# Patient Record
Sex: MALE | Race: WHITE | Employment: FULL TIME | URBAN - METROPOLITAN AREA
[De-identification: names, ages, dates, MRNs, and addresses within clinical notes are randomized per-mention and may not be internally consistent; named-entity substitution may affect disease eponyms.]

---

## 2018-09-20 ENCOUNTER — TELEPHONE (OUTPATIENT)
Dept: FAMILY MEDICINE CLINIC | Age: 53
End: 2018-09-20

## 2018-12-30 ENCOUNTER — TELEPHONE (OUTPATIENT)
Dept: FAMILY MEDICINE CLINIC | Age: 53
End: 2018-12-30

## 2018-12-30 DIAGNOSIS — R05.9 COUGH: Primary | ICD-10-CM

## 2018-12-30 RX ORDER — PROMETHAZINE HYDROCHLORIDE AND CODEINE PHOSPHATE 6.25; 1 MG/5ML; MG/5ML
5 SYRUP ORAL EVERY 4 HOURS PRN
Qty: 118 ML | Refills: 0 | Status: SHIPPED | OUTPATIENT
Start: 2018-12-30 | End: 2019-01-02

## 2018-12-30 RX ORDER — AZITHROMYCIN 250 MG/1
250 TABLET, FILM COATED ORAL SEE ADMIN INSTRUCTIONS
Qty: 6 TABLET | Refills: 0 | Status: SHIPPED | OUTPATIENT
Start: 2018-12-30 | End: 2019-01-04

## 2018-12-30 NOTE — TELEPHONE ENCOUNTER
Ill and texted my personal cell    Controlled Substances Monitoring:     RX Monitoring 12/30/2018   Attestation The Prescription Monitoring Report for this patient was reviewed today. Patient notified via text, needs seen if not better.

## 2019-07-19 ENCOUNTER — OFFICE VISIT (OUTPATIENT)
Dept: FAMILY MEDICINE CLINIC | Age: 54
End: 2019-07-19
Payer: COMMERCIAL

## 2019-07-19 VITALS
OXYGEN SATURATION: 99 % | DIASTOLIC BLOOD PRESSURE: 68 MMHG | HEART RATE: 78 BPM | WEIGHT: 198.6 LBS | BODY MASS INDEX: 24.69 KG/M2 | SYSTOLIC BLOOD PRESSURE: 120 MMHG | HEIGHT: 75 IN | RESPIRATION RATE: 14 BRPM

## 2019-07-19 DIAGNOSIS — R35.1 NOCTURIA: Primary | ICD-10-CM

## 2019-07-19 DIAGNOSIS — Z00.00 WELL ADULT EXAM: ICD-10-CM

## 2019-07-19 DIAGNOSIS — Z80.42 FAMILY HISTORY OF PROSTATE CANCER IN FATHER: ICD-10-CM

## 2019-07-19 LAB
A/G RATIO: 1.8 (ref 1.1–2.2)
ALBUMIN SERPL-MCNC: 4.7 G/DL (ref 3.4–5)
ALP BLD-CCNC: 88 U/L (ref 40–129)
ALT SERPL-CCNC: 23 U/L (ref 10–40)
ANION GAP SERPL CALCULATED.3IONS-SCNC: 10 MMOL/L (ref 3–16)
AST SERPL-CCNC: 24 U/L (ref 15–37)
BILIRUB SERPL-MCNC: <0.2 MG/DL (ref 0–1)
BUN BLDV-MCNC: 14 MG/DL (ref 7–20)
CALCIUM SERPL-MCNC: 9.9 MG/DL (ref 8.3–10.6)
CHLORIDE BLD-SCNC: 103 MMOL/L (ref 99–110)
CHOLESTEROL, TOTAL: 190 MG/DL (ref 0–199)
CO2: 27 MMOL/L (ref 21–32)
CREAT SERPL-MCNC: 1 MG/DL (ref 0.9–1.3)
GFR AFRICAN AMERICAN: >60
GFR NON-AFRICAN AMERICAN: >60
GLOBULIN: 2.6 G/DL
GLUCOSE BLD-MCNC: 71 MG/DL (ref 70–99)
HCT VFR BLD CALC: 47.2 % (ref 40.5–52.5)
HDLC SERPL-MCNC: 52 MG/DL (ref 40–60)
HEMOGLOBIN: 16.1 G/DL (ref 13.5–17.5)
LDL CHOLESTEROL CALCULATED: 119 MG/DL
MCH RBC QN AUTO: 32.1 PG (ref 26–34)
MCHC RBC AUTO-ENTMCNC: 34.2 G/DL (ref 31–36)
MCV RBC AUTO: 93.9 FL (ref 80–100)
PDW BLD-RTO: 13 % (ref 12.4–15.4)
PLATELET # BLD: 226 K/UL (ref 135–450)
PMV BLD AUTO: 9.1 FL (ref 5–10.5)
POTASSIUM SERPL-SCNC: 4.4 MMOL/L (ref 3.5–5.1)
PROSTATE SPECIFIC ANTIGEN: 1.02 NG/ML (ref 0–4)
RBC # BLD: 5.03 M/UL (ref 4.2–5.9)
SODIUM BLD-SCNC: 140 MMOL/L (ref 136–145)
TOTAL PROTEIN: 7.3 G/DL (ref 6.4–8.2)
TRIGL SERPL-MCNC: 95 MG/DL (ref 0–150)
VLDLC SERPL CALC-MCNC: 19 MG/DL
WBC # BLD: 5.1 K/UL (ref 4–11)

## 2019-07-19 PROCEDURE — 99213 OFFICE O/P EST LOW 20 MIN: CPT | Performed by: FAMILY MEDICINE

## 2019-07-19 ASSESSMENT — ENCOUNTER SYMPTOMS
CHEST TIGHTNESS: 0
COUGH: 0
WHEEZING: 0
ABDOMINAL PAIN: 0
BACK PAIN: 0
SHORTNESS OF BREATH: 0

## 2019-07-19 ASSESSMENT — PATIENT HEALTH QUESTIONNAIRE - PHQ9
SUM OF ALL RESPONSES TO PHQ9 QUESTIONS 1 & 2: 0
SUM OF ALL RESPONSES TO PHQ QUESTIONS 1-9: 0
2. FEELING DOWN, DEPRESSED OR HOPELESS: 0
SUM OF ALL RESPONSES TO PHQ QUESTIONS 1-9: 0
1. LITTLE INTEREST OR PLEASURE IN DOING THINGS: 0

## 2019-07-19 NOTE — PROGRESS NOTES
Chief Complaint   Patient presents with   Adele Duong     father DX with Prostate Cancer       [place] NARRATIVE:    Generally healthy gentleman with no current sx or issues. However, his 79 yo father is recently dx with aggressive prostate cancer and he wishes to be tested for his status. We discussed that the biggest risk of PSA testing is false positives or insignificant positives. Patient is established unless otherwise noted. Above chief complaint and [place] obtained by this physician provider. Review of Systems   Constitutional: Negative for activity change, chills, fatigue and fever. HENT: Negative for congestion. Respiratory: Negative for cough, chest tightness, shortness of breath and wheezing. Cardiovascular: Negative for chest pain and leg swelling. Gastrointestinal: Negative for abdominal pain. Genitourinary: Positive for frequency (one extra trip to the bathroom each night, which is old but a little bit worse in the summer). Musculoskeletal: Negative for back pain and myalgias. Skin: Negative for rash. Psychiatric/Behavioral: Negative for behavioral problems and dysphoric mood. Allergies   Allergen Reactions    Cat Hair Extract Anaphylaxis     Red eyes    Dust Mite Extract      Allergy historyupdated. No outpatient medications have been marked as taking for the 7/19/19 encounter (Office Visit) with Carey Borja MD.       Tobacco use history updated. Social History     Tobacco Use   Smoking Status Never Smoker   Smokeless Tobacco Never Used        Nursing note reviewed.     Vitals:    07/19/19 0918   BP: 120/68   Site: Right Upper Arm   Position: Sitting   Cuff Size: Medium Adult   Pulse: 78   Resp: 14   SpO2: 99%   Weight: 198 lb 9.6 oz (90.1 kg)   Height: 6' 3\" (1.905 m)          BP Readings from Last 3 Encounters:   07/19/19 120/68   12/28/16 112/66   10/28/16 124/74     Wt Readings from Last 3 Encounters:   07/19/19 198 lb 9.6 oz (90.1 kg)   12/28/16 193 lb

## 2019-11-12 ENCOUNTER — OFFICE VISIT (OUTPATIENT)
Dept: FAMILY MEDICINE CLINIC | Age: 54
End: 2019-11-12
Payer: COMMERCIAL

## 2019-11-12 VITALS
OXYGEN SATURATION: 96 % | WEIGHT: 196.4 LBS | BODY MASS INDEX: 24.55 KG/M2 | SYSTOLIC BLOOD PRESSURE: 102 MMHG | TEMPERATURE: 98.3 F | HEART RATE: 97 BPM | DIASTOLIC BLOOD PRESSURE: 70 MMHG

## 2019-11-12 DIAGNOSIS — H92.02 OTALGIA OF LEFT EAR: Primary | ICD-10-CM

## 2019-11-12 DIAGNOSIS — H91.92 HEARING LOSS OF LEFT EAR, UNSPECIFIED HEARING LOSS TYPE: ICD-10-CM

## 2019-11-12 PROCEDURE — 99213 OFFICE O/P EST LOW 20 MIN: CPT | Performed by: FAMILY MEDICINE

## 2019-11-12 RX ORDER — AZITHROMYCIN 250 MG/1
250 TABLET, FILM COATED ORAL SEE ADMIN INSTRUCTIONS
Qty: 6 TABLET | Refills: 0 | Status: SHIPPED | OUTPATIENT
Start: 2019-11-12 | End: 2019-11-17

## 2019-11-23 ASSESSMENT — ENCOUNTER SYMPTOMS
BACK PAIN: 0
WHEEZING: 0
COUGH: 0
ABDOMINAL PAIN: 0
SHORTNESS OF BREATH: 0
CHEST TIGHTNESS: 0

## 2021-06-15 ENCOUNTER — OFFICE VISIT (OUTPATIENT)
Dept: FAMILY MEDICINE CLINIC | Age: 56
End: 2021-06-15
Payer: COMMERCIAL

## 2021-06-15 VITALS
WEIGHT: 197 LBS | OXYGEN SATURATION: 94 % | BODY MASS INDEX: 24.62 KG/M2 | HEART RATE: 102 BPM | DIASTOLIC BLOOD PRESSURE: 78 MMHG | SYSTOLIC BLOOD PRESSURE: 110 MMHG

## 2021-06-15 DIAGNOSIS — F43.22 ADJUSTMENT DISORDER WITH ANXIOUS MOOD: ICD-10-CM

## 2021-06-15 DIAGNOSIS — Z00.00 WELL ADULT EXAM: Primary | ICD-10-CM

## 2021-06-15 PROCEDURE — 99396 PREV VISIT EST AGE 40-64: CPT | Performed by: FAMILY MEDICINE

## 2021-06-15 RX ORDER — LORAZEPAM 0.5 MG/1
0.5 TABLET ORAL DAILY PRN
Qty: 15 TABLET | Refills: 0 | Status: SHIPPED | OUTPATIENT
Start: 2021-06-15 | End: 2021-07-15

## 2021-06-15 RX ORDER — ESCITALOPRAM OXALATE 10 MG/1
10 TABLET ORAL DAILY
Qty: 30 TABLET | Refills: 5 | Status: SHIPPED | OUTPATIENT
Start: 2021-06-15 | End: 2021-07-22

## 2021-06-15 ASSESSMENT — PATIENT HEALTH QUESTIONNAIRE - PHQ9
2. FEELING DOWN, DEPRESSED OR HOPELESS: 0
SUM OF ALL RESPONSES TO PHQ9 QUESTIONS 1 & 2: 0
SUM OF ALL RESPONSES TO PHQ QUESTIONS 1-9: 0
SUM OF ALL RESPONSES TO PHQ QUESTIONS 1-9: 0
1. LITTLE INTEREST OR PLEASURE IN DOING THINGS: 0
SUM OF ALL RESPONSES TO PHQ QUESTIONS 1-9: 0

## 2021-06-15 NOTE — PROGRESS NOTES
Chief Complaint   Patient presents with    Annual Exam     here requesting annual exam but not BW, 56 with no known medical problems, active with good BP    Stress Reaction     having a significant stress reaction due to some financial loans that he needs to work with and also 1tenn-21year olds in the house wearing his patience OUT       [place] NARRATIVE:    As above. He does not sleep well and is short tempered. He feels like he is yelling a lot at kids and so do they and his wife so we are hoping to consider bridge to help him for a couple months. He is not much interested in counselling but is willing to try medication. He is off work for the summer. 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 Patient is established unless otherwise noted. Above chief complaint and [place] obtained by this physician provider. Review of Systems   Constitutional: Negative for activity change, chills, fatigue and fever. HENT: Negative for congestion. Respiratory: Negative for cough, chest tightness, shortness of breath and wheezing. Cardiovascular: Negative for chest pain and leg swelling. Gastrointestinal: Negative for abdominal pain. Musculoskeletal: Negative for back pain and myalgias. Skin: Negative for rash. Psychiatric/Behavioral: Positive for decreased concentration, dysphoric mood and sleep disturbance. Negative for behavioral problems. The patient is hyperactive. Allergies   Allergen Reactions    Cat Hair Extract Anaphylaxis     Red eyes    Dust Mite Extract      Allergy historyupdated. Outpatient Medications Marked as Taking for the 6/15/21 encounter (Office Visit) with Leti Fair MD   Medication Sig Dispense Refill    escitalopram (LEXAPRO) 10 MG tablet Take 1 tablet by mouth daily 30 tablet 5    LORazepam (ATIVAN) 0.5 MG tablet Take 1 tablet by mouth daily as needed for Anxiety (severe anxiety or panic) for up to 30 days.  15 tablet 0 Lymphadenopathy:      Cervical: No cervical adenopathy. Skin:     General: Skin is warm and dry. Findings: No erythema or rash. Neurological:      Mental Status: He is alert and oriented to person, place, and time. Cranial Nerves: No cranial nerve deficit. Coordination: Coordination normal.      Deep Tendon Reflexes: Reflexes are normal and symmetric. Psychiatric:         Behavior: Behavior normal.           _________________________________________________  Assessment:     1. Well adult exam  2. Adjustment disorder with anxious mood  -     escitalopram (LEXAPRO) 10 MG tablet; Take 1 tablet by mouth daily, Disp-30 tablet, R-5Normal  -     LORazepam (ATIVAN) 0.5 MG tablet; Take 1 tablet by mouth daily as needed for Anxiety (severe anxiety or panic) for up to 30 days. , Disp-15 tablet, R-0Normal    I offered HIV and Hep C screen, and shingles and Tdap vaccine and he declines. I will get him to take a POCT FECAL IMMUNOCHEMICAL TEST next time. We talked about stress and medication options and counselling. He agrees to try medication. Support is given. See orders above and comments below for details of workup or medication orders. _________________________________________________  Plan:     Return in about 2 months (around 8/15/2021), or if symptoms worsen or fail to improve, for recheck on new med.       Electronically signed by Cj Bridges MD on 6/15/21 at 3:56 PM EDT

## 2021-06-29 ENCOUNTER — TELEPHONE (OUTPATIENT)
Dept: FAMILY MEDICINE CLINIC | Age: 56
End: 2021-06-29

## 2021-06-30 ASSESSMENT — ENCOUNTER SYMPTOMS
WHEEZING: 0
BACK PAIN: 0
CHEST TIGHTNESS: 0
SHORTNESS OF BREATH: 0
COUGH: 0
ABDOMINAL PAIN: 0

## 2021-07-22 ENCOUNTER — OFFICE VISIT (OUTPATIENT)
Dept: FAMILY MEDICINE CLINIC | Age: 56
End: 2021-07-22
Payer: COMMERCIAL

## 2021-07-22 VITALS
HEART RATE: 79 BPM | WEIGHT: 200 LBS | BODY MASS INDEX: 25 KG/M2 | SYSTOLIC BLOOD PRESSURE: 100 MMHG | OXYGEN SATURATION: 92 % | DIASTOLIC BLOOD PRESSURE: 70 MMHG
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
## 2021-07-22 DIAGNOSIS — R42 DIZZINESS: ICD-10-CM

## 2021-07-22 DIAGNOSIS — D12.6 ADENOMATOUS POLYP OF COLON, UNSPECIFIED PART OF COLON: ICD-10-CM

## 2021-07-22 DIAGNOSIS — F43.22 ADJUSTMENT DISORDER WITH ANXIOUS MOOD: Primary | ICD-10-CM

## 2021-07-22 PROCEDURE — 99214 OFFICE O/P EST MOD 30 MIN: CPT | Performed by: FAMILY MEDICINE

## 2021-07-22 RX ORDER — BUPROPION HYDROCHLORIDE 150 MG/1
150 TABLET ORAL EVERY MORNING
Qty: 30 TABLET | Refills: 3 | Status: SHIPPED | OUTPATIENT
Start: 2021-07-22 | End: 2021-11-24

## 2021-07-22 NOTE — PATIENT INSTRUCTIONS
CALL US to tell us how medication is working (less stress, no side effects like dizziness or falling) IN ABOUT 1 MONTH.   If  You are not doing well call sooner or let us know

## 2021-07-22 NOTE — PROGRESS NOTES
Chief Complaint   Patient presents with    1 Month Follow-Up     on mood medication    Dizziness     pt reports more dizziness, pt states he has fallen a few times     Colon Polyps     adenomatous colon polyps found 1/2015, records in chart from 2015       [place] NARRATIVE:    Dizziness and nighttime fatigue, three falls as described. He was prescribed Lexapro and emergency Ativan. He has taken Ativan rarely but Lexapro does not seem to be working and may be contributing to dizziness. Overall however he feels he is doing better. Family opinion on his mood is mixed. Patient is established unless otherwise noted. Above chief complaint and [place] obtained by this physician provider. Review of Systems   Constitutional: Negative for activity change, chills, fatigue and fever. HENT: Negative for congestion. Respiratory: Negative for cough, chest tightness, shortness of breath and wheezing. Cardiovascular: Negative for chest pain and leg swelling. Gastrointestinal: Negative for abdominal pain. Musculoskeletal: Negative for back pain and myalgias. Skin: Negative for rash. Neurological: Positive for dizziness. With multiple falls which is concerning   Psychiatric/Behavioral: Positive for dysphoric mood and sleep disturbance. Negative for behavioral problems. The patient is nervous/anxious. Allergies   Allergen Reactions    Cat Hair Extract Anaphylaxis     Red eyes    Dust Mite Extract      Allergy historyupdated. Outpatient Medications Marked as Taking for the 7/22/21 encounter (Office Visit) with Fito Leger MD   Medication Sig Dispense Refill    buPROPion (WELLBUTRIN XL) 150 MG extended release tablet Take 1 tablet by mouth every morning 30 tablet 3       Tobacco use history updated. Social History     Tobacco Use   Smoking Status Never Smoker   Smokeless Tobacco Never Used        Nursing note reviewed.     Vitals:    07/22/21 1108   BP: 100/70   Site: Left Upper Arm Position: Sitting   Cuff Size: Medium Adult   Pulse: 79   SpO2: 92%   Weight: 200 lb (90.7 kg)          BP Readings from Last 3 Encounters:   07/22/21 100/70   06/15/21 110/78   11/12/19 102/70     Wt Readings from Last 3 Encounters:   07/22/21 200 lb (90.7 kg)   06/15/21 197 lb (89.4 kg)   11/12/19 196 lb 6.4 oz (89.1 kg)     Body mass index is 25 kg/m². No results found for this visit on 07/22/21. Physical Exam  Vitals and nursing note reviewed. Constitutional:       General: He is not in acute distress. Appearance: He is well-developed. He is not diaphoretic. HENT:      Head: Normocephalic and atraumatic. Eyes:      Conjunctiva/sclera: Conjunctivae normal.      Pupils: Pupils are equal, round, and reactive to light. Cardiovascular:      Rate and Rhythm: Normal rate and regular rhythm. Heart sounds: Normal heart sounds. No murmur heard. No friction rub. Pulmonary:      Effort: Pulmonary effort is normal. No respiratory distress. Breath sounds: Normal breath sounds. No wheezing. Skin:     General: Skin is warm and dry. Neurological:      Mental Status: He is alert and oriented to person, place, and time. Psychiatric:         Attention and Perception: Attention and perception normal.         Mood and Affect: Mood is anxious and depressed. Speech: Speech normal.         Behavior: Behavior normal.         Thought Content: Thought content normal.         Cognition and Memory: Cognition and memory normal.         Judgment: Judgment normal.      Comments: Mood is anxious and a little still depressed compared to baseline, probably improved from previous. No evidence of neurocognitive issues. _________________________________________________  Assessment:     1. Adjustment disorder with anxious mood  -     buPROPion (WELLBUTRIN XL) 150 MG extended release tablet;  Take 1 tablet by mouth every morning, Disp-30 tablet, R-3Replacing lexapro which caused dizzinessNormal  2. Adenomatous polyp of colon, unspecified part of colon  -     External Referral To Gastroenterology  3. Dizziness  4. Fall, initial encounter    We will discontinue Lexapro and hold the Ativan. We will try him on Wellbutrin instead. Referral is generated to gastroenterology to recheck his previous polyps. Since he is not having improvement I would like to see him back again if he does not do well. It is concerning that he has fallen. It could be medication side effect but that worries me so if it continues or recurs he will let me know. He declined follow-up appointment but he is to call in about 1 month right before school starts and let me know how he is doing. If he is not doing well will have to see him and change medication. See orders above and comments below for details of workup or medication orders. _________________________________________________  Plan:     Overdue for cscope to f-u adenomatous polyps. Return if symptoms worsen or fail to improve, for patient to call with report in one month before school starts, can be seen if needed.       Electronically signed by Radha Begum MD on 7/22/21 at 11:18 AM EDT

## 2021-07-27 ENCOUNTER — TELEPHONE (OUTPATIENT)
Dept: FAMILY MEDICINE CLINIC | Age: 56
End: 2021-07-27

## 2021-08-15 ASSESSMENT — ENCOUNTER SYMPTOMS
BACK PAIN: 0
ABDOMINAL PAIN: 0
WHEEZING: 0
SHORTNESS OF BREATH: 0
COUGH: 0
CHEST TIGHTNESS: 0

## 2021-11-24 DIAGNOSIS — F43.22 ADJUSTMENT DISORDER WITH ANXIOUS MOOD: ICD-10-CM

## 2021-11-24 RX ORDER — BUPROPION HYDROCHLORIDE 150 MG/1
TABLET ORAL
Qty: 30 TABLET | Refills: 5 | Status: SHIPPED | OUTPATIENT
Start: 2021-11-24

## 2022-11-17 ENCOUNTER — TELEMEDICINE (OUTPATIENT)
Dept: FAMILY MEDICINE CLINIC | Age: 57
End: 2022-11-17
Payer: COMMERCIAL

## 2022-11-17 DIAGNOSIS — F43.22 ADJUSTMENT DISORDER WITH ANXIOUS MOOD: ICD-10-CM

## 2022-11-17 PROCEDURE — 99213 OFFICE O/P EST LOW 20 MIN: CPT | Performed by: FAMILY MEDICINE

## 2022-11-17 RX ORDER — BUPROPION HYDROCHLORIDE 150 MG/1
150 TABLET ORAL EVERY MORNING
Qty: 30 TABLET | Refills: 12 | Status: SHIPPED | OUTPATIENT
Start: 2022-11-17

## 2022-11-17 ASSESSMENT — PATIENT HEALTH QUESTIONNAIRE - PHQ9
SUM OF ALL RESPONSES TO PHQ QUESTIONS 1-9: 0
2. FEELING DOWN, DEPRESSED OR HOPELESS: 0
SUM OF ALL RESPONSES TO PHQ9 QUESTIONS 1 & 2: 0
1. LITTLE INTEREST OR PLEASURE IN DOING THINGS: 0
SUM OF ALL RESPONSES TO PHQ QUESTIONS 1-9: 0

## 2022-11-17 ASSESSMENT — ENCOUNTER SYMPTOMS
COUGH: 0
SINUS PRESSURE: 0
SHORTNESS OF BREATH: 0
EYE DISCHARGE: 0
RHINORRHEA: 0
WHEEZING: 0
SORE THROAT: 0

## 2022-11-17 NOTE — PROGRESS NOTES
2022    TELEHEALTH EVALUATION -- Audio/Visual (During [facility] public health emergency)    TELEHEALTH services provided via [employer] application for this patient who was at home [address] during the visit and the provider at [address] in [facility]. [employer] sent 8:11 AM  Poor connection, we had restarted several times    Time Call began: 8:13 AM  Time Call ended: 8:41 AM     Chief Complaint(s)     Jenny Moreno (:  1965) has requested an audio/video evaluation for the following concern(s):    Chief Complaint   Patient presents with    Depression     Doing pretty well, some recent stressors       HPI: Having a lot of stress with [employer] shooting. Already on medication but worrying more. 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 \"    Now on multivitamin (due to cramps, also B6 at night) wellbutrin in AM which is beneficial.  He IS NOT taking it on weekends, feels like only needs it while at work. Jamie Flavors got caught with vape pen. At [place] [id number]. Review of Systems   Constitutional:  Negative for activity change, fatigue and fever. HENT:  Negative for congestion, rhinorrhea, sinus pressure, sneezing and sore throat. Eyes:  Negative for discharge. Respiratory:  Negative for cough, shortness of breath and wheezing. Cardiovascular:  Negative for chest pain. Musculoskeletal:  Negative for neck pain. Allergic/Immunologic: Negative for environmental allergies. Psychiatric/Behavioral:  Positive for dysphoric mood. The patient is not nervous/anxious. Prior to Visit Medications    Medication Sig Taking?  Authorizing Provider   buPROPion (WELLBUTRIN XL) 150 MG extended release tablet Take 1 tablet by mouth every morning Yes Kareen Mohamud MD       Social History     Tobacco Use    Smoking status: Never    Smokeless tobacco: Never            VISIT VITALS AND DATA (may not be from today):  Nursing note reviewed  There were no vitals taken for this visit.  BP Readings from Last 3 Encounters:   07/22/21 100/70   06/15/21 110/78   11/12/19 102/70     Wt Readings from Last 3 Encounters:   07/22/21 200 lb (90.7 kg)   06/15/21 197 lb (89.4 kg)   11/12/19 196 lb 6.4 oz (89.1 kg)     There is no height or weight on file to calculate BMI. No results found for this visit on 11/17/22. PATIENT REPORTED VITALS FROM TODAY IF ENTERED:  Patient-Reported Vitals 11/17/2022   Patient-Reported Weight 193   Patient-Reported Height 6'3\"        PHYSICAL EXAMINATION:  [ INSTRUCTIONS:  \"[x]\" Indicates a positive item  \"[]\" Indicates a negative item  -- DELETE ALL ITEMS NOT EXAMINED]    Constitutional:   [x] Appears well-developed and well-nourished   [x] No apparent distress    [] Abnormal-     Mental status:  [x] Alert and awake    [x] Oriented to person/place/time   [x] Able to follow commands    [] Abnormal-     Eyes:  EOM     [x]  Normal   [] Abnormal-  Sclera   [x]  Normal   [] Abnormal -  Discharge  [x]  None visible      HEENT:   [x] Normocephalic, atraumatic. [x] Mouth/Throat: Mucous membranes are moist.   [] Abnormal     External Ears:   [x] Normal    [] Abnormal-     Neck:   [x] No visualized mass     Pulmonary/Chest:   [x] Respiratory effort normal.    [x] No visualized signs of difficulty breathing or respiratory distress  [] Abnormal-      Musculoskeletal:    [] Normal gait with no signs of ataxia   [x] Normal range of motion of neck  [] Abnormal-     Neurological:          [x] No Facial Asymmetry (Cranial nerve 7 motor function) (limited exam to video visit)     [x] No gaze palsy   [] Abnormal-         Skin:          [x] No significant exanthematous lesions or discoloration noted on facial skin    [] Abnormal-            Psychiatric:         [x] Normal Affect   [x] No Hallucinations   [] Abnormal-     Other pertinent observable physical exam findings-     ASSESSMENT/PLAN:   Diagnosis Orders   1.  Adjustment disorder with anxious mood  buPROPion (WELLBUTRIN XL) 150 MG extended release tablet        I advised him to try taking the Wellbutrin daily for a month or 2 to see if he felt better with that plan. He is okay to stop and on breaks in weekends if he prefers. He did not report any side effects. Return in about 1 year (around 11/17/2023), or if symptoms worsen or fail to improve, for 1 year recheck. Lilia Brown is a 65 y.o. male being evaluated by a Virtual Visit (video visit) encounter to address concerns as mentioned above. A caregiver was present when appropriate. PATIENT GAVE EXPRESS VERBAL CONSENT TODAY to receiving care by a VIRTUAL VISIT. Due to this being a TeleHealth encounter (During [facility]-16 public health emergency), evaluation of the following organ systems was limited: Vitals/Constitutional/EENT/Resp/CV/GI//MS/Neuro/Skin/Heme-Lymph-Imm. Pursuant to the emergency declaration under the [address] [place] [address] and the [employer] Act, this Virtual Visit was conducted with patient's (and/or legal guardian's) consent, to reduce the patient's risk of exposure to COVID-19 and provide necessary medical care. The patient (and/or legal guardian) has also been advised to contact this office for worsening conditions or problems, and seek emergency medical treatment and/or call 911 if deemed necessary. Services were provided through a video synchronous discussion virtually to substitute for in-person clinic visit. --Pb Friend MD on 11/17/2022 at 8:38 AM    An electronic signature was used to authenticate this note.

## 2023-11-17 ENCOUNTER — OFFICE VISIT (OUTPATIENT)
Dept: FAMILY MEDICINE CLINIC | Age: 58
End: 2023-11-17
Payer: COMMERCIAL

## 2023-11-17 VITALS
WEIGHT: 197 LBS | HEART RATE: 94 BPM | DIASTOLIC BLOOD PRESSURE: 86 MMHG | SYSTOLIC BLOOD PRESSURE: 118 MMHG | OXYGEN SATURATION: 95 % | BODY MASS INDEX: 24.49 KG/M2 | HEIGHT: 75 IN

## 2023-11-17 DIAGNOSIS — F43.22 ADJUSTMENT DISORDER WITH ANXIOUS MOOD: ICD-10-CM

## 2023-11-17 DIAGNOSIS — Z00.00 ENCOUNTER FOR WELL ADULT EXAM WITHOUT ABNORMAL FINDINGS: Primary | ICD-10-CM

## 2023-11-17 PROCEDURE — 99396 PREV VISIT EST AGE 40-64: CPT | Performed by: FAMILY MEDICINE

## 2023-11-17 RX ORDER — BUSPIRONE HYDROCHLORIDE 10 MG/1
10 TABLET ORAL DAILY
COMMUNITY
End: 2023-11-17

## 2023-11-17 RX ORDER — LORATADINE 10 MG/1
10 TABLET ORAL DAILY
COMMUNITY
End: 2023-11-17

## 2023-11-17 RX ORDER — BUPROPION HYDROCHLORIDE 150 MG/1
150 TABLET ORAL EVERY MORNING
Qty: 30 TABLET | Refills: 12 | Status: SHIPPED | OUTPATIENT
Start: 2023-11-17

## 2023-11-17 SDOH — ECONOMIC STABILITY: INCOME INSECURITY: HOW HARD IS IT FOR YOU TO PAY FOR THE VERY BASICS LIKE FOOD, HOUSING, MEDICAL CARE, AND HEATING?: NOT HARD AT ALL

## 2023-11-17 SDOH — ECONOMIC STABILITY: HOUSING INSECURITY
IN THE LAST 12 MONTHS, WAS THERE A TIME WHEN YOU DID NOT HAVE A STEADY PLACE TO SLEEP OR SLEPT IN A SHELTER (INCLUDING NOW)?: NO

## 2023-11-17 SDOH — ECONOMIC STABILITY: FOOD INSECURITY: WITHIN THE PAST 12 MONTHS, THE FOOD YOU BOUGHT JUST DIDN'T LAST AND YOU DIDN'T HAVE MONEY TO GET MORE.: NEVER TRUE

## 2023-11-17 SDOH — ECONOMIC STABILITY: FOOD INSECURITY: WITHIN THE PAST 12 MONTHS, YOU WORRIED THAT YOUR FOOD WOULD RUN OUT BEFORE YOU GOT MONEY TO BUY MORE.: NEVER TRUE

## 2023-11-17 ASSESSMENT — PATIENT HEALTH QUESTIONNAIRE - PHQ9
SUM OF ALL RESPONSES TO PHQ QUESTIONS 1-9: 0
SUM OF ALL RESPONSES TO PHQ QUESTIONS 1-9: 0
1. LITTLE INTEREST OR PLEASURE IN DOING THINGS: 0
SUM OF ALL RESPONSES TO PHQ QUESTIONS 1-9: 0
SUM OF ALL RESPONSES TO PHQ9 QUESTIONS 1 & 2: 0
SUM OF ALL RESPONSES TO PHQ QUESTIONS 1-9: 0
2. FEELING DOWN, DEPRESSED OR HOPELESS: 0

## 2023-11-17 ASSESSMENT — ENCOUNTER SYMPTOMS
WHEEZING: 0
SHORTNESS OF BREATH: 0
CHEST TIGHTNESS: 0
BACK PAIN: 0
ABDOMINAL PAIN: 0
COUGH: 0

## 2024-11-05 ASSESSMENT — PATIENT HEALTH QUESTIONNAIRE - PHQ9
1. LITTLE INTEREST OR PLEASURE IN DOING THINGS: NOT AT ALL
SUM OF ALL RESPONSES TO PHQ QUESTIONS 1-9: 0
SUM OF ALL RESPONSES TO PHQ QUESTIONS 1-9: 0
2. FEELING DOWN, DEPRESSED OR HOPELESS: NOT AT ALL
SUM OF ALL RESPONSES TO PHQ QUESTIONS 1-9: 0
SUM OF ALL RESPONSES TO PHQ9 QUESTIONS 1 & 2: 0
2. FEELING DOWN, DEPRESSED OR HOPELESS: NOT AT ALL
SUM OF ALL RESPONSES TO PHQ9 QUESTIONS 1 & 2: 0
1. LITTLE INTEREST OR PLEASURE IN DOING THINGS: NOT AT ALL
SUM OF ALL RESPONSES TO PHQ QUESTIONS 1-9: 0

## 2024-11-06 ENCOUNTER — OFFICE VISIT (OUTPATIENT)
Dept: FAMILY MEDICINE CLINIC | Age: 59
End: 2024-11-06

## 2024-11-06 VITALS
BODY MASS INDEX: 25.87 KG/M2 | WEIGHT: 207 LBS | HEART RATE: 100 BPM | DIASTOLIC BLOOD PRESSURE: 84 MMHG | SYSTOLIC BLOOD PRESSURE: 122 MMHG | OXYGEN SATURATION: 94 %

## 2024-11-06 DIAGNOSIS — F43.22 ADJUSTMENT DISORDER WITH ANXIOUS MOOD: ICD-10-CM

## 2024-11-06 DIAGNOSIS — S93.491A SPRAIN OF ANTERIOR TALOFIBULAR LIGAMENT OF RIGHT ANKLE, INITIAL ENCOUNTER: Primary | ICD-10-CM

## 2024-11-06 RX ORDER — BUPROPION HYDROCHLORIDE 150 MG/1
150 TABLET ORAL EVERY MORNING
Qty: 30 TABLET | Refills: 12 | Status: SHIPPED | OUTPATIENT
Start: 2024-11-06

## 2024-11-06 ASSESSMENT — ENCOUNTER SYMPTOMS
WHEEZING: 0
SHORTNESS OF BREATH: 0
BACK PAIN: 0
CHEST TIGHTNESS: 0
COUGH: 0
ABDOMINAL PAIN: 0

## 2024-11-06 NOTE — PROGRESS NOTES
Visit) with Sasha Hi MD   Medication Sig Dispense Refill    buPROPion (WELLBUTRIN XL) 150 MG extended release tablet Take 1 tablet by mouth every morning 30 tablet 12       Tobacco use history updated.   Social History     Tobacco Use   Smoking Status Never   Smokeless Tobacco Never        Nursing note reviewed.    Vitals:    11/06/24 1550   BP: 122/84   Site: Left Upper Arm   Position: Sitting   Cuff Size: Medium Adult   Pulse: 100   SpO2: 94%   Weight: 93.9 kg (207 lb)          BP Readings from Last 3 Encounters:   11/06/24 122/84   11/17/23 118/86   07/22/21 100/70     Wt Readings from Last 3 Encounters:   11/06/24 93.9 kg (207 lb)   11/17/23 89.4 kg (197 lb)   07/22/21 90.7 kg (200 lb)     Body mass index is 25.87 kg/m².    No results found for this visit on 11/06/24.    Physical Exam      Physical Exam  Vitals and nursing note reviewed.   Constitutional:       General: He is not in acute distress.     Appearance: He is well-developed. He is not diaphoretic.   HENT:      Head: Normocephalic and atraumatic.      Nose: Nose normal.   Eyes:      Conjunctiva/sclera: Conjunctivae normal.      Pupils: Pupils are equal, round, and reactive to light.   Cardiovascular:      Rate and Rhythm: Normal rate and regular rhythm.      Heart sounds: Normal heart sounds. No murmur heard.     No friction rub.   Pulmonary:      Effort: Pulmonary effort is normal. No respiratory distress.      Breath sounds: Normal breath sounds. No wheezing.   Musculoskeletal:         General: Swelling (He does indeed have lateral para malleoli are swelling on the right ankle with minimal tenderness good range of motion and good strength in all planes; left ankle is essentially normal to exam) present. Normal range of motion.      Cervical back: Normal range of motion and neck supple.   Skin:     General: Skin is warm and dry.      Coloration: Skin is not jaundiced.   Neurological:      Mental Status: He is alert and oriented to person, place,

## 2024-12-05 DIAGNOSIS — F43.22 ADJUSTMENT DISORDER WITH ANXIOUS MOOD: ICD-10-CM

## 2024-12-05 RX ORDER — BUPROPION HYDROCHLORIDE 150 MG/1
150 TABLET ORAL EVERY MORNING
Qty: 30 TABLET | Refills: 12 | OUTPATIENT
Start: 2024-12-05

## 2025-07-22 ENCOUNTER — TELEPHONE (OUTPATIENT)
Dept: FAMILY MEDICINE CLINIC | Age: 60
End: 2025-07-22

## 2025-07-22 NOTE — TELEPHONE ENCOUNTER
Pts wife, Ernestina called. She stated pt was confused with memory loss this morning. He was outside with his son doing yard work, trimming trees. She had handed him a letter and he got upset because he wasn't comprehending what it was saying. She stated he had forgotten about coming out of [employer] (letter was from employer stating pt was to return to work in the Fall). Pt had planned on going back to work for his daughters upcoming wedding. Pt was forgetting things that he had just done. Ernestina stated his physical appearance was good and his speech was good. She was concerned due to no prior memory loss until this morning. Ernestina called office and asked if pt should make an appt or should she take him directly to the ER. I spoke with PCP and she advised pt to go to ER.

## 2025-07-24 ENCOUNTER — OFFICE VISIT (OUTPATIENT)
Dept: FAMILY MEDICINE CLINIC | Age: 60
End: 2025-07-24
Payer: COMMERCIAL

## 2025-07-24 VITALS
OXYGEN SATURATION: 97 % | SYSTOLIC BLOOD PRESSURE: 102 MMHG | HEIGHT: 75 IN | BODY MASS INDEX: 25.87 KG/M2 | DIASTOLIC BLOOD PRESSURE: 78 MMHG | HEART RATE: 82 BPM

## 2025-07-24 DIAGNOSIS — F43.22 ADJUSTMENT DISORDER WITH ANXIOUS MOOD: ICD-10-CM

## 2025-07-24 DIAGNOSIS — R41.89 COGNITIVE AND BEHAVIORAL CHANGES: Primary | ICD-10-CM

## 2025-07-24 DIAGNOSIS — R46.89 COGNITIVE AND BEHAVIORAL CHANGES: Primary | ICD-10-CM

## 2025-07-24 PROCEDURE — 99214 OFFICE O/P EST MOD 30 MIN: CPT | Performed by: FAMILY MEDICINE

## 2025-07-24 RX ORDER — BUPROPION HYDROCHLORIDE 150 MG/1
150 TABLET ORAL EVERY MORNING
Qty: 90 TABLET | Refills: 4 | Status: SHIPPED | OUTPATIENT
Start: 2025-07-24

## 2025-07-24 RX ORDER — BUPROPION HYDROCHLORIDE 150 MG/1
150 TABLET ORAL EVERY MORNING
Qty: 30 TABLET | Refills: 12 | Status: SHIPPED | OUTPATIENT
Start: 2025-07-24 | End: 2025-07-24

## 2025-07-24 RX ORDER — LORATADINE 10 MG/1
10 TABLET ORAL DAILY
COMMUNITY

## 2025-07-24 ASSESSMENT — PATIENT HEALTH QUESTIONNAIRE - PHQ9
2. FEELING DOWN, DEPRESSED OR HOPELESS: NOT AT ALL
SUM OF ALL RESPONSES TO PHQ QUESTIONS 1-9: 0

## 2025-07-24 NOTE — PROGRESS NOTES
Encounters:   07/24/25 102/78   11/06/24 122/84   11/17/23 118/86     Wt Readings from Last 3 Encounters:   11/06/24 93.9 kg (207 lb)   11/17/23 89.4 kg (197 lb)   07/22/21 90.7 kg (200 lb)     Body mass index is 25.87 kg/m².    No results found for this visit on 07/24/25.    Physical Exam  Neurological: Alert and oriented, no focal deficits noted.  Respiratory: Clear to auscultation, no wheezing, rales or rhonchi  Cardiovascular: Regular rate and rhythm, no murmurs, rubs, or gallops  Gastrointestinal: Soft, no tenderness, no distention, no masses    Physical Exam  Vitals and nursing note reviewed.   Constitutional:       General: He is not in acute distress.     Appearance: He is well-developed. He is not diaphoretic.   HENT:      Head: Normocephalic and atraumatic.      Nose: Nose normal.   Eyes:      Conjunctiva/sclera: Conjunctivae normal.      Pupils: Pupils are equal, round, and reactive to light.   Cardiovascular:      Rate and Rhythm: Normal rate and regular rhythm.      Heart sounds: Normal heart sounds. No murmur heard.     No friction rub.   Pulmonary:      Effort: Pulmonary effort is normal. No respiratory distress.      Breath sounds: Normal breath sounds. No wheezing.   Musculoskeletal:         General: Normal range of motion.      Cervical back: Normal range of motion and neck supple.   Skin:     General: Skin is warm and dry.      Coloration: Skin is not jaundiced.   Neurological:      General: No focal deficit present.      Mental Status: He is alert and oriented to person, place, and time. Mental status is at baseline.      Cranial Nerves: No cranial nerve deficit.      Motor: No weakness.      Gait: Gait normal.   Psychiatric:         Mood and Affect: Mood normal.         Behavior: Behavior normal.         Thought Content: Thought content normal.         Results  Labs   - Serum labs: 07/22/2025, Negative    Imaging   - CT of the head without dye: 07/22/2025, Negative    ER notes and test results

## 2025-07-31 ENCOUNTER — TELEPHONE (OUTPATIENT)
Dept: FAMILY MEDICINE CLINIC | Age: 60
End: 2025-07-31

## 2025-07-31 NOTE — TELEPHONE ENCOUNTER
Rebecca from [facility] needs last two office notes before 8/11 when pt is having testing completed.  Visit on 7/24 needs to be signed.  F# [phone]

## 2025-08-01 ASSESSMENT — ENCOUNTER SYMPTOMS
ABDOMINAL PAIN: 0
WHEEZING: 0
COUGH: 0
BACK PAIN: 0
CHEST TIGHTNESS: 0
SHORTNESS OF BREATH: 0